# Patient Record
Sex: FEMALE | Race: WHITE
[De-identification: names, ages, dates, MRNs, and addresses within clinical notes are randomized per-mention and may not be internally consistent; named-entity substitution may affect disease eponyms.]

---

## 2020-09-30 ENCOUNTER — HOSPITAL ENCOUNTER (OUTPATIENT)
Dept: HOSPITAL 76 - LAB | Age: 30
Discharge: HOME | End: 2020-09-30
Attending: MIDWIFE
Payer: COMMERCIAL

## 2020-09-30 DIAGNOSIS — O09.01: Primary | ICD-10-CM

## 2020-09-30 DIAGNOSIS — Z3A.00: ICD-10-CM

## 2020-09-30 PROCEDURE — 84702 CHORIONIC GONADOTROPIN TEST: CPT

## 2020-09-30 PROCEDURE — 81599 UNLISTED MAAA: CPT

## 2020-09-30 PROCEDURE — 36415 COLL VENOUS BLD VENIPUNCTURE: CPT

## 2020-09-30 PROCEDURE — 84144 ASSAY OF PROGESTERONE: CPT

## 2021-05-15 ENCOUNTER — HOSPITAL ENCOUNTER (OUTPATIENT)
Dept: HOSPITAL 76 - DI | Age: 31
Discharge: HOME | End: 2021-05-15
Attending: NAPRAPATH
Payer: COMMERCIAL

## 2021-05-15 DIAGNOSIS — R94.7: Primary | ICD-10-CM

## 2021-05-15 DIAGNOSIS — R93.89: ICD-10-CM

## 2021-05-15 DIAGNOSIS — Z87.59: ICD-10-CM

## 2021-05-15 NOTE — ULTRASOUND REPORT
PROCEDURE:  Pelvic w/Transvaginal

 

INDICATIONS:  HX OF SPONTANEOUS 

 

TECHNIQUE:  

Real-time scanning was performed of the pelvic organs, with image documentation.  Additional endovagi
nal scanning was necessary due to incomplete visualization of the adnexal and endometrial structures 
by transabdominal scanning.  

 

COMPARISON:  None.

 

FINDINGS:  

No pathologic free abdominal or pelvic fluid.  

 

Uterus: Uterus is anteverted measuring 7.7 x 3.9 x 4.8 cm. No suspicious myometrial mass. The endomet
rium measures 19 mm in thickness. No definite mass there is however a prominent vessel noted along th
e posterior aspect with adjacent heterogeneity which may suggest a small polyp.

 

Ovaries:  The right ovary measures 4.7 x 2.0 x 1.7 cm for a volume of 8.0 mL. The left ovary measures
 4.0 x 1.8 x 1.7 cm for a volume of 6.5 mm. Multiple physiologic follicles.

 

IMPRESSION:  

Endometrial thickness measures 18 mm. There is a prominent vessel noted posteriorly with slight heter
ogeneity. This may represent a small polyp. Consider further evaluation with dedicated hysterosonogra
m.

 

Unremarkable appearance of the ovaries.

 

Reviewed by: Sudarshan Dent DO on 5/15/2021 7:56 PM [facility]

Approved by: Sudarshan Dent DO on 5/15/2021 7:56 PM [facility]

 

 

Station ID:  [id number]